# Patient Record
Sex: FEMALE | Race: ASIAN | NOT HISPANIC OR LATINO
[De-identification: names, ages, dates, MRNs, and addresses within clinical notes are randomized per-mention and may not be internally consistent; named-entity substitution may affect disease eponyms.]

---

## 2022-10-25 PROBLEM — Z00.00 ENCOUNTER FOR PREVENTIVE HEALTH EXAMINATION: Status: ACTIVE | Noted: 2022-10-25

## 2022-10-28 ENCOUNTER — NON-APPOINTMENT (OUTPATIENT)
Age: 67
End: 2022-10-28

## 2022-10-28 ENCOUNTER — APPOINTMENT (OUTPATIENT)
Dept: COLORECTAL SURGERY | Facility: CLINIC | Age: 67
End: 2022-10-28

## 2022-10-28 VITALS
TEMPERATURE: 97.2 F | DIASTOLIC BLOOD PRESSURE: 81 MMHG | HEART RATE: 92 BPM | BODY MASS INDEX: 24.8 KG/M2 | SYSTOLIC BLOOD PRESSURE: 157 MMHG | HEIGHT: 67 IN | WEIGHT: 158 LBS

## 2022-10-28 DIAGNOSIS — Z12.11 ENCOUNTER FOR SCREENING FOR MALIGNANT NEOPLASM OF COLON: ICD-10-CM

## 2022-10-28 PROCEDURE — 99205 OFFICE O/P NEW HI 60 MIN: CPT

## 2022-10-28 RX ORDER — METRONIDAZOLE 500 MG/1
500 TABLET ORAL
Qty: 6 | Refills: 0 | Status: ACTIVE | COMMUNITY
Start: 2022-10-28 | End: 1900-01-01

## 2022-10-28 RX ORDER — CIPROFLOXACIN HYDROCHLORIDE 500 MG/1
500 TABLET, FILM COATED ORAL
Qty: 2 | Refills: 0 | Status: ACTIVE | COMMUNITY
Start: 2022-10-28 | End: 1900-01-01

## 2022-10-28 RX ORDER — POLYETHYLENE GLYCOL 3350 17 G/17G
17 POWDER, FOR SOLUTION ORAL
Qty: 238 | Refills: 0 | Status: ACTIVE | COMMUNITY
Start: 2022-10-28 | End: 1900-01-01

## 2022-10-28 NOTE — HISTORY OF PRESENT ILLNESS
[FreeTextEntry1] : 68 yo Mandarin speaking F presents for evaluation of colon mass seen on CT scan, referred by GI Amari Cordon\par PMH HTN, HLD, On ASA 81 mg for general heart health\par PSH B/L breast surgery, path c/w fibroadenoma per pt\par FMH: Father w/ lung CA (tobacco use), Mother w/ colon CA diagnosed age 90s\par \par \par Pt denies prior colonoscopy. Negative FOBT 2020, 2021 and most recently was positive 10/24/22\par PCP referred to Dr. Ian Cordon for colonoscopy, however not performed and referred to this office for further evaluation\par \par Per chart review, PCP collected labs 10/6/22 at [facility]: Hgb/Hct 14/41, LFTs, Alk P  WNL\par Due to h/o known renal stones and past appendicitis (noted on 4/2022 CT scan, s/p hospitalization w/ abx, however surgery never completed) PCP Dr. Jennyfer Billingsley referred for repeat CT a/p which was completed at [facility] on 10/24/22 for c/o chronic appendicitis:\par Impression:\par Focal hyperenhancement at medial cecum involving the origin of the appendix suggestive of soft tissue mass. The appendix is dilated showing wall enhancement and mild wall thickening w/ subtle periappendiceal fat stranding. The findings suggest cecal mass (possible malignancy) causing obstruction of the appendix. If not recently performed, a colonoscopy is recommended\par Hepatic cysts\par Non obstructing left renal calculi. B/L renal cysts\par Prior outside imaging not available for comparison\par \par Pt reports she feels well. Appetite and energy good\par Denies fever, unintentional weight loss, abd pain, n/v/c/d, change in bowel habits, BRBPR\par \par BH: once daily, loose which is typical for her\par Denies intake of dietary fiber and water\par Denies use of stool softeners or fiber supplements\par \par Prior imaging:\par CT urogram/CT a/p completed at [facility] 4/29/22 for evaluation of renal stone/abd pain:\par Appendix thickened and dilated w/ jerzy-appendiceal fat stranding. There is a 1.5x1.9x1.6 cm collection in the tip of the appendix, possible abscess formation and/or dilated lumen. No evidence of extraluminal air. No bowel obstruction.\par Impression:\par Findings c/w acute appendicitis. Collection may r/t abscess formation and or dilated lumen\par PCP was referred to ER, possibly [facility], treated w/ antibiotics and recommended surgery in 2-3 months\par Per pt, was recommended to have surgery, however due to family emergency, surgery was postponed and never occurred\par \par

## 2022-10-28 NOTE — ASSESSMENT
[FreeTextEntry1] : I have discussed with the patient with a Chinese  and her findings of CAT scan are consistent with a right colon cancer.  We will proceed with colonoscopy for further evaluation and assessment including biopsy.  In addition we will perform colonoscopy to exclude possible synchronous secondary colorectal polyps and cancer.\par \par .\par \par I had extensive discussion with the patient (60 minutes) regarding the diagnosis and treatment options. I recommended that he consider proceeding with a robotic right hemicolectomy if the biopsy is consistent with a right colon cancer.\par The associated risks, benefits, alternatives of the procedure have been outlined discussed and reviewed with the patient's family. These risks including but not limited to bleeding, infection, anastomotic leak, need for secondary surgery, need for ileostomy or colostomy creation, change in bowel habits,  as well as the risk of heart and lung complications infection and death were detailed. The patient understands these risks and consents the planned procedure. Appropriate  literature regarding surgery and post operative treatment/complications and enhanced recovery pathway has been detailed and reviewed. Consent was obtained. All questions were answered.\par \par A chinese  was utilized for the entire visit . All questions were addressed.\par

## 2022-11-02 ENCOUNTER — APPOINTMENT (OUTPATIENT)
Dept: COLORECTAL SURGERY | Facility: CLINIC | Age: 67
End: 2022-11-02

## 2022-11-02 PROCEDURE — 45385 COLONOSCOPY W/LESION REMOVAL: CPT

## 2022-11-02 PROCEDURE — 45380 COLONOSCOPY AND BIOPSY: CPT | Mod: 59

## 2022-11-05 ENCOUNTER — RESULT REVIEW (OUTPATIENT)
Age: 67
End: 2022-11-05

## 2022-11-05 ENCOUNTER — OUTPATIENT (OUTPATIENT)
Dept: OUTPATIENT SERVICES | Facility: HOSPITAL | Age: 67
LOS: 1 days | End: 2022-11-05

## 2022-11-05 ENCOUNTER — APPOINTMENT (OUTPATIENT)
Dept: CT IMAGING | Facility: CLINIC | Age: 67
End: 2022-11-05

## 2022-11-05 PROCEDURE — 71250 CT THORAX DX C-: CPT | Mod: 26

## 2022-11-07 ENCOUNTER — TRANSCRIPTION ENCOUNTER (OUTPATIENT)
Age: 67
End: 2022-11-07

## 2022-11-07 VITALS
SYSTOLIC BLOOD PRESSURE: 157 MMHG | DIASTOLIC BLOOD PRESSURE: 72 MMHG | WEIGHT: 155.65 LBS | TEMPERATURE: 97 F | RESPIRATION RATE: 16 BRPM | HEIGHT: 67 IN | OXYGEN SATURATION: 97 % | HEART RATE: 87 BPM

## 2022-11-08 ENCOUNTER — TRANSCRIPTION ENCOUNTER (OUTPATIENT)
Age: 67
End: 2022-11-08

## 2022-11-08 ENCOUNTER — APPOINTMENT (OUTPATIENT)
Dept: COLORECTAL SURGERY | Facility: HOSPITAL | Age: 67
End: 2022-11-08

## 2022-11-08 ENCOUNTER — RESULT REVIEW (OUTPATIENT)
Age: 67
End: 2022-11-08

## 2022-11-08 ENCOUNTER — INPATIENT (INPATIENT)
Facility: HOSPITAL | Age: 67
LOS: 3 days | Discharge: HOME | DRG: 331 | End: 2022-11-12
Attending: SURGERY | Admitting: SURGERY
Payer: MEDICARE

## 2022-11-08 DIAGNOSIS — Z98.890 OTHER SPECIFIED POSTPROCEDURAL STATES: Chronic | ICD-10-CM

## 2022-11-08 LAB
BLD GP AB SCN SERPL QL: NEGATIVE — SIGNIFICANT CHANGE UP
RH IG SCN BLD-IMP: POSITIVE — SIGNIFICANT CHANGE UP

## 2022-11-08 PROCEDURE — 88309 TISSUE EXAM BY PATHOLOGIST: CPT | Mod: 26

## 2022-11-08 PROCEDURE — 44205 LAP COLECTOMY PART W/ILEUM: CPT | Mod: GC

## 2022-11-08 DEVICE — CLIP APPLIER ETHICON LIGAMAX 5MM: Type: IMPLANTABLE DEVICE | Site: RIGHT | Status: FUNCTIONAL

## 2022-11-08 DEVICE — XI STAPLER SUREFORM RELOAD 60 WHITE: Type: IMPLANTABLE DEVICE | Site: RIGHT | Status: FUNCTIONAL

## 2022-11-08 DEVICE — XI STAPLER SUREFORM RELOAD 60 BLUE: Type: IMPLANTABLE DEVICE | Site: RIGHT | Status: FUNCTIONAL

## 2022-11-08 RX ORDER — SODIUM CHLORIDE 9 MG/ML
1000 INJECTION, SOLUTION INTRAVENOUS
Refills: 0 | Status: DISCONTINUED | OUTPATIENT
Start: 2022-11-08 | End: 2022-11-09

## 2022-11-08 RX ORDER — ATORVASTATIN CALCIUM 80 MG/1
1 TABLET, FILM COATED ORAL
Qty: 0 | Refills: 0 | DISCHARGE

## 2022-11-08 RX ORDER — KETOROLAC TROMETHAMINE 30 MG/ML
15 SYRINGE (ML) INJECTION EVERY 6 HOURS
Refills: 0 | Status: DISCONTINUED | OUTPATIENT
Start: 2022-11-08 | End: 2022-11-11

## 2022-11-08 RX ORDER — HEPARIN SODIUM 5000 [USP'U]/ML
5000 INJECTION INTRAVENOUS; SUBCUTANEOUS EVERY 8 HOURS
Refills: 0 | Status: DISCONTINUED | OUTPATIENT
Start: 2022-11-08 | End: 2022-11-12

## 2022-11-08 RX ORDER — SCOPALAMINE 1 MG/3D
1 PATCH, EXTENDED RELEASE TRANSDERMAL ONCE
Refills: 0 | Status: COMPLETED | OUTPATIENT
Start: 2022-11-08 | End: 2022-11-08

## 2022-11-08 RX ORDER — AMLODIPINE BESYLATE 2.5 MG/1
1 TABLET ORAL
Qty: 0 | Refills: 0 | DISCHARGE

## 2022-11-08 RX ORDER — ACETAMINOPHEN 500 MG
1000 TABLET ORAL ONCE
Refills: 0 | Status: COMPLETED | OUTPATIENT
Start: 2022-11-08 | End: 2022-11-08

## 2022-11-08 RX ORDER — HYDROMORPHONE HYDROCHLORIDE 2 MG/ML
0.5 INJECTION INTRAMUSCULAR; INTRAVENOUS; SUBCUTANEOUS EVERY 4 HOURS
Refills: 0 | Status: DISCONTINUED | OUTPATIENT
Start: 2022-11-08 | End: 2022-11-10

## 2022-11-08 RX ORDER — ATORVASTATIN CALCIUM 80 MG/1
10 TABLET, FILM COATED ORAL AT BEDTIME
Refills: 0 | Status: DISCONTINUED | OUTPATIENT
Start: 2022-11-08 | End: 2022-11-12

## 2022-11-08 RX ORDER — HEPARIN SODIUM 5000 [USP'U]/ML
5000 INJECTION INTRAVENOUS; SUBCUTANEOUS ONCE
Refills: 0 | Status: COMPLETED | OUTPATIENT
Start: 2022-11-08 | End: 2022-11-08

## 2022-11-08 RX ORDER — AMLODIPINE BESYLATE 2.5 MG/1
5 TABLET ORAL DAILY
Refills: 0 | Status: DISCONTINUED | OUTPATIENT
Start: 2022-11-09 | End: 2022-11-09

## 2022-11-08 RX ORDER — ACETAMINOPHEN 500 MG
1000 TABLET ORAL EVERY 6 HOURS
Refills: 0 | Status: DISCONTINUED | OUTPATIENT
Start: 2022-11-08 | End: 2022-11-12

## 2022-11-08 RX ORDER — ONDANSETRON 8 MG/1
4 TABLET, FILM COATED ORAL EVERY 8 HOURS
Refills: 0 | Status: DISCONTINUED | OUTPATIENT
Start: 2022-11-08 | End: 2022-11-12

## 2022-11-08 RX ORDER — METOCLOPRAMIDE HCL 10 MG
10 TABLET ORAL ONCE
Refills: 0 | Status: COMPLETED | OUTPATIENT
Start: 2022-11-08 | End: 2022-11-08

## 2022-11-08 RX ADMIN — ONDANSETRON 4 MILLIGRAM(S): 8 TABLET, FILM COATED ORAL at 19:03

## 2022-11-08 RX ADMIN — HEPARIN SODIUM 5000 UNIT(S): 5000 INJECTION INTRAVENOUS; SUBCUTANEOUS at 12:32

## 2022-11-08 RX ADMIN — Medication 10 MILLIGRAM(S): at 19:57

## 2022-11-08 RX ADMIN — Medication 15 MILLIGRAM(S): at 22:04

## 2022-11-08 RX ADMIN — Medication 400 MILLIGRAM(S): at 19:16

## 2022-11-08 RX ADMIN — Medication 1000 MILLIGRAM(S): at 12:31

## 2022-11-08 RX ADMIN — SCOPALAMINE 1 PATCH: 1 PATCH, EXTENDED RELEASE TRANSDERMAL at 19:58

## 2022-11-08 RX ADMIN — SODIUM CHLORIDE 40 MILLILITER(S): 9 INJECTION, SOLUTION INTRAVENOUS at 22:05

## 2022-11-08 RX ADMIN — HEPARIN SODIUM 5000 UNIT(S): 5000 INJECTION INTRAVENOUS; SUBCUTANEOUS at 22:05

## 2022-11-08 RX ADMIN — Medication 1000 MILLIGRAM(S): at 19:31

## 2022-11-08 RX ADMIN — Medication 15 MILLIGRAM(S): at 23:00

## 2022-11-08 NOTE — PRE-ANESTHESIA EVALUATION ADULT - NSANTHOSAYNRD_GEN_A_CORE
No. DARIANA screening performed.  STOP BANG Legend: 0-2 = LOW Risk; 3-4 = INTERMEDIATE Risk; 5-8 = HIGH Risk

## 2022-11-08 NOTE — H&P ADULT - NSICDXPASTMEDICALHX_GEN_ALL_CORE_FT
PAST MEDICAL HISTORY:  Arthritis knee    Elevated cholesterol     HTN (hypertension)     Kidney stones     Other specified diseases of intestine

## 2022-11-08 NOTE — H&P ADULT - HISTORY OF PRESENT ILLNESS
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 This year's FOBT positive for blood along with CT A/P in 10/22 notable for mass of the cecum. Cscope on 11/2/22 notable for polyps in the rectym, transverse colon and cecum- cecal lesion notable for TVA with HG dysplasia. CT chest 11/5 negative for metastasis.

## 2022-11-08 NOTE — PROGRESS NOTE ADULT - SUBJECTIVE AND OBJECTIVE BOX
POST-OPERATIVE NOTE    Procedure: Robot-assisted laparoscopic right hemicolectomy    Diagnosis/Indication: Colonic mass    Surgeon: Dr. Jackson    S: Pt complaints of dizziness and slight nausea, however is resting comfortably in bed. Denies CP, SOB, calf tenderness, and vomiting. Pain controlled with medication. ( number [id number])    O:  T(C): 36.6 (11-08-22 @ 21:22), Max: 36.6 (11-08-22 @ 21:22)  T(F): 97.8 (11-08-22 @ 21:22), Max: 97.8 (11-08-22 @ 21:22)  HR: 71 (11-08-22 @ 21:22) (62 - 104)  BP: 117/69 (11-08-22 @ 21:22) (114/59 - 159/74)  RR: 16 (11-08-22 @ 21:22) (10 - 20)  SpO2: 100% (11-08-22 @ 21:22) (98% - 100%)  Wt(kg): --    Gen: NAD, resting comfortably in bed  C/V: NSR  Pulm: Nonlabored breathing, no respiratory distress  Abd: soft, NT/ND, no rebound or guarding, incision areas are covered with derma bond and are C/D/I with no erythema  : Sales with appropriate urine output with light yellow urine  Extrem: WWP, no calf edema or tenderness, SCDs in place  Neuro: AAOx3 when spoken to    A/P: 67y Female s/p above procedure  Diet: CLD  IVF: LR @ 40cc/hr  Pain/nausea control  Sales  SQH/SCDs/OOBA/IS  Dispo pending pain control, PO tolerance, clinical improvement

## 2022-11-08 NOTE — BRIEF OPERATIVE NOTE - OPERATION/FINDINGS
Veress needle access. Optiview placement of 12mm port; rest of ports placed under direct visualization. TAP block. Right colon and hepatic flexure mobilized. High ligation of ileocolic and R branch of middle colic with Vessel sealer. Proximal and distal margins taken with robotic stapler blue load. Ileocolic anastomosis created with robotic stapler white load x 1 fire. Common channel closed with V-lock running suture and Lemberted. Hemostatic. 8cm off midline incision created in RLQ and specimen removed. Closing tray utilized; gowns and gloves changed. Peritoneum closed with 2-0 Vicryl running; fascia closed with #1 PDS running. Closed skin with 3-0 Vicryl deep dermal and 4-0 Monocryl.

## 2022-11-08 NOTE — H&P ADULT - NSHPPHYSICALEXAM_GEN_ALL_CORE
Constitutional: no acute distress  Heart: s1 s2  Lungs: no use of accessory muscles  Abdomen: soft, nontender, nondistended, without masses, erythema, ecchymosis  Extremities: no edema

## 2022-11-08 NOTE — PRE-ANESTHESIA EVALUATION ADULT - NSPROPOSEDPROCEDFT_GEN_ALL_CORE
Patient: Kellie Cox    Procedure Summary     Date: 07/22/21 Room / Location:  [facility]  /  [facility]    Anesthesia Start: 0755 Anesthesia Stop: 0848    Procedure: LAPAROSCOPIC CHOLECYSTECTOMY WITH CHOLANGIOGRAM (N/A Abdomen) Diagnosis: (BILIARY DYSKINESIA)    Surgeons: Nell Alvarado MD Provider: Mario Carrillo CRNA    Anesthesia Type: general ASA Status: 3          Anesthesia Type: general    Vitals  Vitals Value Taken Time   /64 07/22/21 0928   Temp 98.5 °F (36.9 °C) 07/22/21 0933   Pulse 96 07/22/21 0933   Resp 15 07/22/21 0933   SpO2 96 % 07/22/21 0933   Vitals shown include unvalidated device data.        Post Anesthesia Care and Evaluation    Patient location during evaluation: PACU  Patient participation: complete - patient participated  Level of consciousness: awake and alert  Pain management: adequate  Airway patency: patent  Anesthetic complications: No anesthetic complications  PONV Status: none  Cardiovascular status: acceptable and hemodynamically stable  Respiratory status: acceptable  Hydration status: acceptable    Comments: Blood pressure 105/64, pulse 107, temperature 98.5 °F (36.9 °C), temperature source Temporal, resp. rate 15, SpO2 96 %, not currently breastfeeding.    Patient discharged from PACU based upon Lori score. Please see RN notes for further details      
Colon Ca

## 2022-11-08 NOTE — H&P ADULT - ASSESSMENT
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      Plan:  to OR  admission post op  ERAS protocol  Colon bundle

## 2022-11-09 ENCOUNTER — RESULT REVIEW (OUTPATIENT)
Age: 67
End: 2022-11-09

## 2022-11-09 LAB
ANION GAP SERPL CALC-SCNC: 10 MMOL/L — SIGNIFICANT CHANGE UP (ref 5–17)
BUN SERPL-MCNC: 9 MG/DL — SIGNIFICANT CHANGE UP (ref 7–23)
CALCIUM SERPL-MCNC: 8.5 MG/DL — SIGNIFICANT CHANGE UP (ref 8.4–10.5)
CHLORIDE SERPL-SCNC: 98 MMOL/L — SIGNIFICANT CHANGE UP (ref 96–108)
CO2 SERPL-SCNC: 27 MMOL/L — SIGNIFICANT CHANGE UP (ref 22–31)
CREAT SERPL-MCNC: 0.68 MG/DL — SIGNIFICANT CHANGE UP (ref 0.5–1.3)
EGFR: 94 ML/MIN/1.73M2 — SIGNIFICANT CHANGE UP
GLUCOSE SERPL-MCNC: 122 MG/DL — HIGH (ref 70–99)
HCT VFR BLD CALC: 36.6 % — SIGNIFICANT CHANGE UP (ref 34.5–45)
HCV AB S/CO SERPL IA: 0.05 S/CO — SIGNIFICANT CHANGE UP
HCV AB SERPL-IMP: SIGNIFICANT CHANGE UP
HGB BLD-MCNC: 12.1 G/DL — SIGNIFICANT CHANGE UP (ref 11.5–15.5)
MAGNESIUM SERPL-MCNC: 1.8 MG/DL — SIGNIFICANT CHANGE UP (ref 1.6–2.6)
MCHC RBC-ENTMCNC: 30.3 PG — SIGNIFICANT CHANGE UP (ref 27–34)
MCHC RBC-ENTMCNC: 33.1 GM/DL — SIGNIFICANT CHANGE UP (ref 32–36)
MCV RBC AUTO: 91.5 FL — SIGNIFICANT CHANGE UP (ref 80–100)
NRBC # BLD: 0 /100 WBCS — SIGNIFICANT CHANGE UP (ref 0–0)
PHOSPHATE SERPL-MCNC: 3.4 MG/DL — SIGNIFICANT CHANGE UP (ref 2.5–4.5)
PLATELET # BLD AUTO: 218 K/UL — SIGNIFICANT CHANGE UP (ref 150–400)
POTASSIUM SERPL-MCNC: 3.7 MMOL/L — SIGNIFICANT CHANGE UP (ref 3.5–5.3)
POTASSIUM SERPL-SCNC: 3.7 MMOL/L — SIGNIFICANT CHANGE UP (ref 3.5–5.3)
RBC # BLD: 4 M/UL — SIGNIFICANT CHANGE UP (ref 3.8–5.2)
RBC # FLD: 12.2 % — SIGNIFICANT CHANGE UP (ref 10.3–14.5)
SODIUM SERPL-SCNC: 135 MMOL/L — SIGNIFICANT CHANGE UP (ref 135–145)
WBC # BLD: 7.97 K/UL — SIGNIFICANT CHANGE UP (ref 3.8–10.5)
WBC # FLD AUTO: 7.97 K/UL — SIGNIFICANT CHANGE UP (ref 3.8–10.5)

## 2022-11-09 PROCEDURE — 88321 CONSLTJ&REPRT SLD PREP ELSWR: CPT

## 2022-11-09 RX ORDER — BENZOCAINE AND MENTHOL 5; 1 G/100ML; G/100ML
1 LIQUID ORAL
Refills: 0 | Status: DISCONTINUED | OUTPATIENT
Start: 2022-11-09 | End: 2022-11-12

## 2022-11-09 RX ORDER — BENZOCAINE AND MENTHOL 5; 1 G/100ML; G/100ML
1 LIQUID ORAL
Refills: 0 | Status: DISCONTINUED | OUTPATIENT
Start: 2022-11-09 | End: 2022-11-09

## 2022-11-09 RX ORDER — POTASSIUM CHLORIDE 20 MEQ
20 PACKET (EA) ORAL ONCE
Refills: 0 | Status: COMPLETED | OUTPATIENT
Start: 2022-11-09 | End: 2022-11-09

## 2022-11-09 RX ORDER — MAGNESIUM SULFATE 500 MG/ML
1 VIAL (ML) INJECTION ONCE
Refills: 0 | Status: COMPLETED | OUTPATIENT
Start: 2022-11-09 | End: 2022-11-09

## 2022-11-09 RX ORDER — AMLODIPINE BESYLATE 2.5 MG/1
5 TABLET ORAL DAILY
Refills: 0 | Status: DISCONTINUED | OUTPATIENT
Start: 2022-11-10 | End: 2022-11-12

## 2022-11-09 RX ADMIN — Medication 1000 MILLIGRAM(S): at 06:00

## 2022-11-09 RX ADMIN — Medication 1000 MILLIGRAM(S): at 06:25

## 2022-11-09 RX ADMIN — HEPARIN SODIUM 5000 UNIT(S): 5000 INJECTION INTRAVENOUS; SUBCUTANEOUS at 06:00

## 2022-11-09 RX ADMIN — Medication 15 MILLIGRAM(S): at 10:30

## 2022-11-09 RX ADMIN — Medication 100 GRAM(S): at 07:54

## 2022-11-09 RX ADMIN — Medication 15 MILLIGRAM(S): at 10:22

## 2022-11-09 RX ADMIN — Medication 1000 MILLIGRAM(S): at 13:12

## 2022-11-09 RX ADMIN — AMLODIPINE BESYLATE 5 MILLIGRAM(S): 2.5 TABLET ORAL at 10:22

## 2022-11-09 RX ADMIN — Medication 15 MILLIGRAM(S): at 17:44

## 2022-11-09 RX ADMIN — Medication 1000 MILLIGRAM(S): at 01:30

## 2022-11-09 RX ADMIN — Medication 15 MILLIGRAM(S): at 04:06

## 2022-11-09 RX ADMIN — Medication 15 MILLIGRAM(S): at 23:01

## 2022-11-09 RX ADMIN — BENZOCAINE AND MENTHOL 1 LOZENGE: 5; 1 LIQUID ORAL at 20:25

## 2022-11-09 RX ADMIN — Medication 1000 MILLIGRAM(S): at 17:44

## 2022-11-09 RX ADMIN — SCOPALAMINE 1 PATCH: 1 PATCH, EXTENDED RELEASE TRANSDERMAL at 06:29

## 2022-11-09 RX ADMIN — SCOPALAMINE 1 PATCH: 1 PATCH, EXTENDED RELEASE TRANSDERMAL at 17:44

## 2022-11-09 RX ADMIN — Medication 20 MILLIEQUIVALENT(S): at 07:54

## 2022-11-09 RX ADMIN — Medication 1000 MILLIGRAM(S): at 23:00

## 2022-11-09 RX ADMIN — Medication 1000 MILLIGRAM(S): at 00:30

## 2022-11-09 RX ADMIN — ATORVASTATIN CALCIUM 10 MILLIGRAM(S): 80 TABLET, FILM COATED ORAL at 21:12

## 2022-11-09 RX ADMIN — HEPARIN SODIUM 5000 UNIT(S): 5000 INJECTION INTRAVENOUS; SUBCUTANEOUS at 13:59

## 2022-11-09 RX ADMIN — Medication 15 MILLIGRAM(S): at 17:41

## 2022-11-09 RX ADMIN — HEPARIN SODIUM 5000 UNIT(S): 5000 INJECTION INTRAVENOUS; SUBCUTANEOUS at 21:13

## 2022-11-09 RX ADMIN — Medication 1000 MILLIGRAM(S): at 12:36

## 2022-11-09 RX ADMIN — Medication 1000 MILLIGRAM(S): at 17:41

## 2022-11-09 RX ADMIN — Medication 15 MILLIGRAM(S): at 05:20

## 2022-11-09 NOTE — PROGRESS NOTE ADULT - SUBJECTIVE AND OBJECTIVE BOX
INTERVAL HPI/OVERNIGHT EVENTS: pain controlled, c/o dizziness on POC and slight nausea, good urine o/p, vss     STATUS POST: 11/8: robotic assisted right hemicolectomy    POST OPERATIVE DAY #: 1    SUBJECTIVE: Pt seen and examined at bedside this am by surgery team. Reporting feeling dizzy when getting up, VSS. Tolerating diet, pain well controlled. -F/-BM. Denies f/n/v/cp/sob.    MEDICATIONS  (STANDING):  acetaminophen     Tablet .. 1000 milliGRAM(s) Oral every 6 hours  amLODIPine   Tablet 5 milliGRAM(s) Oral daily  atorvastatin 10 milliGRAM(s) Oral at bedtime  heparin   Injectable 5000 Unit(s) SubCutaneous every 8 hours  ketorolac   Injectable 15 milliGRAM(s) IV Push every 6 hours  lactated ringers. 1000 milliLiter(s) (40 mL/Hr) IV Continuous <Continuous>    MEDICATIONS  (PRN):  HYDROmorphone  Injectable 0.5 milliGRAM(s) IV Push every 4 hours PRN Severe Pain (7 - 10)  ondansetron Injectable 4 milliGRAM(s) IV Push every 8 hours PRN Nausea and/or Vomiting    Vital Signs Last 24 Hrs  T(C): 36.8 (09 Nov 2022 04:26), Max: 36.8 (09 Nov 2022 04:26)  T(F): 98.2 (09 Nov 2022 04:26), Max: 98.2 (09 Nov 2022 04:26)  HR: 74 (09 Nov 2022 04:26) (62 - 104)  BP: 110/68 (09 Nov 2022 04:26) (110/68 - 159/74)  BP(mean): 81 (08 Nov 2022 20:33) (81 - 106)  RR: 17 (09 Nov 2022 04:26) (10 - 20)  SpO2: 95% (09 Nov 2022 04:26) (95% - 100%)    Parameters below as of 09 Nov 2022 04:26  Patient On (Oxygen Delivery Method): room air    PHYSICAL EXAM:    Constitutional: A&Ox3, NAD    Respiratory: non labored breathing, no respiratory distress    Cardiovascular: NSR, RRR    Gastrointestinal: abdomen soft, nd, appropriately ttp to incisions. Dressings c/d/i    Genitourinary: Sales in place draining clear yellow urine     Extremities: wwp, no calf tenderness or edema. SCDs in place     I&O's Detail    08 Nov 2022 07:01  -  09 Nov 2022 07:00  --------------------------------------------------------  IN:    IV PiggyBack: 100 mL    Lactated Ringers: 600 mL  Total IN: 700 mL    OUT:    Indwelling Catheter - Urethral (mL): 1550 mL  Total OUT: 1550 mL    Total NET: -850 mL          LABS:                        12.1   7.97  )-----------( 218      ( 09 Nov 2022 05:43 )             36.6     11-09    135  |  98  |  9   ----------------------------<  122<H>  3.7   |  27  |  0.68    Ca    8.5      09 Nov 2022 05:43  Phos  3.4     11-09  Mg     1.8     11-09            RADIOLOGY & ADDITIONAL STUDIES:

## 2022-11-10 ENCOUNTER — OUTPATIENT (OUTPATIENT)
Dept: OUTPATIENT SERVICES | Facility: HOSPITAL | Age: 67
LOS: 1 days | End: 2022-11-10
Payer: MEDICARE

## 2022-11-10 ENCOUNTER — TRANSCRIPTION ENCOUNTER (OUTPATIENT)
Age: 67
End: 2022-11-10

## 2022-11-10 DIAGNOSIS — K63.89 OTHER SPECIFIED DISEASES OF INTESTINE: ICD-10-CM

## 2022-11-10 DIAGNOSIS — Z98.890 OTHER SPECIFIED POSTPROCEDURAL STATES: Chronic | ICD-10-CM

## 2022-11-10 PROBLEM — E78.00 PURE HYPERCHOLESTEROLEMIA, UNSPECIFIED: Chronic | Status: ACTIVE | Noted: 2022-11-07

## 2022-11-10 PROBLEM — I10 ESSENTIAL (PRIMARY) HYPERTENSION: Chronic | Status: ACTIVE | Noted: 2022-11-07

## 2022-11-10 LAB
ANION GAP SERPL CALC-SCNC: 9 MMOL/L — SIGNIFICANT CHANGE UP (ref 5–17)
BUN SERPL-MCNC: 11 MG/DL — SIGNIFICANT CHANGE UP (ref 7–23)
CALCIUM SERPL-MCNC: 8.6 MG/DL — SIGNIFICANT CHANGE UP (ref 8.4–10.5)
CHLORIDE SERPL-SCNC: 104 MMOL/L — SIGNIFICANT CHANGE UP (ref 96–108)
CO2 SERPL-SCNC: 26 MMOL/L — SIGNIFICANT CHANGE UP (ref 22–31)
CREAT SERPL-MCNC: 0.75 MG/DL — SIGNIFICANT CHANGE UP (ref 0.5–1.3)
EGFR: 86 ML/MIN/1.73M2 — SIGNIFICANT CHANGE UP
GLUCOSE SERPL-MCNC: 106 MG/DL — HIGH (ref 70–99)
HCT VFR BLD CALC: 36.1 % — SIGNIFICANT CHANGE UP (ref 34.5–45)
HGB BLD-MCNC: 11.9 G/DL — SIGNIFICANT CHANGE UP (ref 11.5–15.5)
MAGNESIUM SERPL-MCNC: 2.2 MG/DL — SIGNIFICANT CHANGE UP (ref 1.6–2.6)
MCHC RBC-ENTMCNC: 30.2 PG — SIGNIFICANT CHANGE UP (ref 27–34)
MCHC RBC-ENTMCNC: 33 GM/DL — SIGNIFICANT CHANGE UP (ref 32–36)
MCV RBC AUTO: 91.6 FL — SIGNIFICANT CHANGE UP (ref 80–100)
NRBC # BLD: 0 /100 WBCS — SIGNIFICANT CHANGE UP (ref 0–0)
PHOSPHATE SERPL-MCNC: 2.6 MG/DL — SIGNIFICANT CHANGE UP (ref 2.5–4.5)
PLATELET # BLD AUTO: 204 K/UL — SIGNIFICANT CHANGE UP (ref 150–400)
POTASSIUM SERPL-MCNC: 3.8 MMOL/L — SIGNIFICANT CHANGE UP (ref 3.5–5.3)
POTASSIUM SERPL-SCNC: 3.8 MMOL/L — SIGNIFICANT CHANGE UP (ref 3.5–5.3)
RBC # BLD: 3.94 M/UL — SIGNIFICANT CHANGE UP (ref 3.8–5.2)
RBC # FLD: 12.6 % — SIGNIFICANT CHANGE UP (ref 10.3–14.5)
SODIUM SERPL-SCNC: 139 MMOL/L — SIGNIFICANT CHANGE UP (ref 135–145)
WBC # BLD: 7.66 K/UL — SIGNIFICANT CHANGE UP (ref 3.8–10.5)
WBC # FLD AUTO: 7.66 K/UL — SIGNIFICANT CHANGE UP (ref 3.8–10.5)

## 2022-11-10 PROCEDURE — 88321 CONSLTJ&REPRT SLD PREP ELSWR: CPT

## 2022-11-10 RX ORDER — POTASSIUM PHOSPHATE, MONOBASIC POTASSIUM PHOSPHATE, DIBASIC 236; 224 MG/ML; MG/ML
15 INJECTION, SOLUTION INTRAVENOUS ONCE
Refills: 0 | Status: COMPLETED | OUTPATIENT
Start: 2022-11-10 | End: 2022-11-10

## 2022-11-10 RX ORDER — OXYCODONE HYDROCHLORIDE 5 MG/1
10 TABLET ORAL EVERY 6 HOURS
Refills: 0 | Status: DISCONTINUED | OUTPATIENT
Start: 2022-11-10 | End: 2022-11-12

## 2022-11-10 RX ORDER — OXYCODONE HYDROCHLORIDE 5 MG/1
5 TABLET ORAL EVERY 6 HOURS
Refills: 0 | Status: DISCONTINUED | OUTPATIENT
Start: 2022-11-10 | End: 2022-11-12

## 2022-11-10 RX ADMIN — Medication 1000 MILLIGRAM(S): at 05:12

## 2022-11-10 RX ADMIN — ATORVASTATIN CALCIUM 10 MILLIGRAM(S): 80 TABLET, FILM COATED ORAL at 21:05

## 2022-11-10 RX ADMIN — POTASSIUM PHOSPHATE, MONOBASIC POTASSIUM PHOSPHATE, DIBASIC 62.5 MILLIMOLE(S): 236; 224 INJECTION, SOLUTION INTRAVENOUS at 11:47

## 2022-11-10 RX ADMIN — HEPARIN SODIUM 5000 UNIT(S): 5000 INJECTION INTRAVENOUS; SUBCUTANEOUS at 05:12

## 2022-11-10 RX ADMIN — SCOPALAMINE 1 PATCH: 1 PATCH, EXTENDED RELEASE TRANSDERMAL at 17:11

## 2022-11-10 RX ADMIN — Medication 15 MILLIGRAM(S): at 00:10

## 2022-11-10 RX ADMIN — Medication 1000 MILLIGRAM(S): at 23:01

## 2022-11-10 RX ADMIN — Medication 15 MILLIGRAM(S): at 05:10

## 2022-11-10 RX ADMIN — Medication 15 MILLIGRAM(S): at 17:46

## 2022-11-10 RX ADMIN — Medication 1000 MILLIGRAM(S): at 23:49

## 2022-11-10 RX ADMIN — Medication 15 MILLIGRAM(S): at 11:47

## 2022-11-10 RX ADMIN — Medication 1000 MILLIGRAM(S): at 18:00

## 2022-11-10 RX ADMIN — Medication 1000 MILLIGRAM(S): at 06:00

## 2022-11-10 RX ADMIN — Medication 1000 MILLIGRAM(S): at 12:00

## 2022-11-10 RX ADMIN — Medication 15 MILLIGRAM(S): at 23:49

## 2022-11-10 RX ADMIN — HEPARIN SODIUM 5000 UNIT(S): 5000 INJECTION INTRAVENOUS; SUBCUTANEOUS at 13:11

## 2022-11-10 RX ADMIN — HEPARIN SODIUM 5000 UNIT(S): 5000 INJECTION INTRAVENOUS; SUBCUTANEOUS at 21:05

## 2022-11-10 RX ADMIN — SCOPALAMINE 1 PATCH: 1 PATCH, EXTENDED RELEASE TRANSDERMAL at 05:18

## 2022-11-10 RX ADMIN — Medication 15 MILLIGRAM(S): at 12:00

## 2022-11-10 RX ADMIN — Medication 1000 MILLIGRAM(S): at 00:10

## 2022-11-10 RX ADMIN — Medication 15 MILLIGRAM(S): at 18:00

## 2022-11-10 RX ADMIN — Medication 15 MILLIGRAM(S): at 23:01

## 2022-11-10 RX ADMIN — AMLODIPINE BESYLATE 5 MILLIGRAM(S): 2.5 TABLET ORAL at 05:10

## 2022-11-10 RX ADMIN — Medication 15 MILLIGRAM(S): at 06:00

## 2022-11-10 RX ADMIN — Medication 1000 MILLIGRAM(S): at 17:46

## 2022-11-10 RX ADMIN — Medication 1000 MILLIGRAM(S): at 11:47

## 2022-11-10 NOTE — DISCHARGE NOTE PROVIDER - NSDCCPCAREPLAN_GEN_ALL_CORE_FT
PRINCIPAL DISCHARGE DIAGNOSIS  Diagnosis: S/P right hemicolectomy  Assessment and Plan of Treatment: 67F, mandarin speaking, with pmh of HTN, HLD, GERD, anxiety and psh of b/l breast lumpectomy (fibdroadenoma) now s/p RA right hemicolectomy for cecal adenoma 11/8.  LFD  Pain/nausea control prn  SQH/SCDs  OOBA/IS

## 2022-11-10 NOTE — DISCHARGE NOTE PROVIDER - CARE PROVIDER_API CALL
Tushar Jackson)  ColonRectal Surgery; Surgery  [facility], 2nd Floor  [place], [place] [address]  Phone: [phone]  Fax: [phone]  Follow Up Time:

## 2022-11-10 NOTE — DISCHARGE NOTE PROVIDER - HOSPITAL COURSE
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 FOBT positive for blood along with CT A/P in 10/22 notable for mass of the cecum. Cscope on 11/2/22 notable for polyps in the rectum, transverse colon and cecum- cecal lesion notable for TVA with HG dysplasia. CT chest 11/5 negative for metastasis. Pt was admitted for elective RA right hemicolectomy. She is now s/p right hemicolectomy on 11/8. Patient's postoperative course was uncomplicated. Diet was advanced as tolerated and pain was well controlled on medication. On the day of discharge, the patient was deemed stable and ready to return home with a plan to follow up as an outpatient.      INCOMPLETE!!    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 FOBT positive for blood along with CT A/P in 10/22 notable for mass of the cecum. Cscope on 11/2/22 notable for polyps in the rectum, transverse colon and cecum- cecal lesion notable for TVA with HG dysplasia. CT chest 11/5 negative for metastasis. Pt was admitted for elective RA right hemicolectomy. She is now s/p right hemicolectomy on 11/8. Patient's postoperative course was uncomplicated. Diet was advanced as tolerated and pain was well controlled on medication. On the day of discharge, the patient was deemed stable and ready to return home with a plan to follow up as an outpatient.

## 2022-11-10 NOTE — DISCHARGE NOTE PROVIDER - NSDCCPTREATMENT_GEN_ALL_CORE_FT
PRINCIPAL PROCEDURE  Procedure: Robot-assisted laparoscopic right hemicolectomy  Findings and Treatment:

## 2022-11-10 NOTE — DISCHARGE NOTE PROVIDER - NSDCFUADDINST_GEN_ALL_CORE_FT
General Discharge Instructions:  Please resume all regular home medications unless specifically advised not to take a particular medication. Also, please take any new medications as prescribed.  Please get plenty of rest, continue to ambulate several times per day, and drink adequate amounts of fluids. Avoid lifting weights greater than 5-10 lbs until you follow-up with your surgeon, who will instruct you further regarding activity restrictions.  Avoid driving or operating heavy machinery while taking pain medications.  Please follow-up with your surgeon and Primary Care Provider (PCP) as advised.  Incision Care:  *Please call your doctor or nurse practitioner if you have increased pain, swelling, redness, or drainage from the incision site.  *Avoid swimming and baths until your follow-up appointment.  *You may shower, and wash surgical incisions with a mild soap and warm water. Gently pat the area dry.  *If you have staples, they will be removed at your follow-up appointment.  *If you have steri-strips, they will fall off on their own. Please remove any remaining strips 7-10 days after surgery.   General Discharge Instructions:  Please resume all regular home medications unless specifically advised not to take a particular medication. Also, please take any new medications as prescribed.    Do not restart aspirin until follow up with Dr. Jackson.    Please get plenty of rest, continue to ambulate several times per day, and drink adequate amounts of fluids. Avoid lifting weights greater than 5-10 lbs until you follow-up with your surgeon, who will instruct you further regarding activity restrictions.  Avoid driving or operating heavy machinery while taking pain medications.  Please follow-up with your surgeon and Primary Care Provider (PCP) as advised.  Incision Care:  *Please call your doctor or nurse practitioner if you have increased pain, swelling, redness, or drainage from the incision site.  *Avoid swimming and baths until your follow-up appointment.  *You may shower, and wash surgical incisions with a mild soap and warm water. Gently pat the area dry.  *If you have staples, they will be removed at your follow-up appointment.  *If you have steri-strips, they will fall off on their own. Please remove any remaining strips 7-10 days after surgery.   General Discharge Instructions:  Please resume all regular home medications unless specifically advised not to take a particular medication. Also, please take any new medications as prescribed.    You may take over-the-counter pain medications such as tylenol (acetaminophen) and motrin (ibuprofen NSAID - anti-inflammatory).    Do not restart aspirin until follow up with Dr. Jackson.    Please get plenty of rest, continue to ambulate several times per day, and drink adequate amounts of fluids. Avoid lifting weights greater than 5-10 lbs until you follow-up with your surgeon, who will instruct you further regarding activity restrictions.  Avoid driving or operating heavy machinery while taking pain medications.  Please follow-up with your surgeon and Primary Care Provider (PCP) as advised.  Incision Care:  *Please call your doctor or nurse practitioner if you have increased pain, swelling, redness, or drainage from the incision site.  *Avoid swimming and baths until your follow-up appointment.  *You may shower, and wash surgical incisions with a mild soap and warm water. Gently pat the area dry.  *If you have staples, they will be removed at your follow-up appointment.  *If you have steri-strips, they will fall off on their own. Please remove any remaining strips 7-10 days after surgery.

## 2022-11-10 NOTE — PROGRESS NOTE ADULT - SUBJECTIVE AND OBJECTIVE BOX
INTERVAL HPI/OVERNIGHT EVENTS: jennifer cld, -n/-v, +F/-BM    STATUS POST: 11/8: robotic assisted right hemicolectomy    POST OPERATIVE DAY #: 2    SUBJECTIVE: Pt seen and examined at bedside this am by surgery team. No acute complaints. Tolerating diet, pain well controlled. -F/-BM. Denies f/n/v/cp/sob.    MEDICATIONS  (STANDING):  acetaminophen     Tablet .. 1000 milliGRAM(s) Oral every 6 hours  amLODIPine   Tablet 5 milliGRAM(s) Oral daily  atorvastatin 10 milliGRAM(s) Oral at bedtime  heparin   Injectable 5000 Unit(s) SubCutaneous every 8 hours  ketorolac   Injectable 15 milliGRAM(s) IV Push every 6 hours    MEDICATIONS  (PRN):  benzocaine 15 mG/menthol 3.6 mG Lozenge 1 Lozenge Oral two times a day PRN Sore Throat  ondansetron Injectable 4 milliGRAM(s) IV Push every 8 hours PRN Nausea and/or Vomiting  oxyCODONE    IR 5 milliGRAM(s) Oral every 6 hours PRN Moderate Pain (4 - 6)  oxyCODONE    IR 10 milliGRAM(s) Oral every 6 hours PRN Severe Pain (7 - 10)    Vital Signs Last 24 Hrs  T(C): 36.7 (10 Nov 2022 05:07), Max: 37.4 (09 Nov 2022 20:14)  T(F): 98 (10 Nov 2022 05:07), Max: 99.3 (09 Nov 2022 20:14)  HR: 62 (10 Nov 2022 05:07) (62 - 67)  BP: 119/73 (10 Nov 2022 05:07) (101/59 - 119/73)  BP(mean): 88 (10 Nov 2022 05:07) (79 - 88)  RR: 17 (10 Nov 2022 05:07) (17 - 18)  SpO2: 95% (10 Nov 2022 05:07) (94% - 98%)    Parameters below as of 10 Nov 2022 05:07  Patient On (Oxygen Delivery Method): room air    PHYSICAL EXAM:    Constitutional: A&Ox3, NAD    Respiratory: non labored breathing, no respiratory distress    Cardiovascular: NSR, RRR    Gastrointestinal: abdomen soft, nd, appropriately ttp to incisions. Dressings c/d/i    Extremities: wwp, no calf tenderness or edema. SCDs in place     I&O's Detail    09 Nov 2022 07:01  -  10 Nov 2022 07:00  --------------------------------------------------------  IN:    Lactated Ringers: 360 mL    Oral Fluid: 1460 mL  Total IN: 1820 mL    OUT:    Indwelling Catheter - Urethral (mL): 925 mL    Voided (mL): 1700 mL  Total OUT: 2625 mL    Total NET: -805 mL          LABS:                        11.9   7.66  )-----------( 204      ( 10 Nov 2022 06:48 )             36.1     11-10    139  |  104  |  11  ----------------------------<  106<H>  3.8   |  26  |  0.75    Ca    8.6      10 Nov 2022 06:48  Phos  2.6     11-10  Mg     2.2     11-10            RADIOLOGY & ADDITIONAL STUDIES:

## 2022-11-10 NOTE — DISCHARGE NOTE PROVIDER - NSDCMRMEDTOKEN_GEN_ALL_CORE_FT
amLODIPine 5 mg oral tablet: 1 tab(s) orally once a day  aspirin 81 mg oral tablet: orally once a day  atorvastatin 10 mg oral tablet: 1 tab(s) orally once a day   amLODIPine 5 mg oral tablet: 1 tab(s) orally once a day  atorvastatin 10 mg oral tablet: 1 tab(s) orally once a day  Colace 100 mg oral capsule: 1 cap(s) orally every 8 hours While taking oxycodone  oxyCODONE 5 mg oral tablet: 1 tab(s) orally every 6 hours MDD:4   amLODIPine 5 mg oral tablet: 1 tab(s) orally once a day  atorvastatin 10 mg oral tablet: 1 tab(s) orally once a day  Colace 100 mg oral capsule: 1 cap(s) orally every 8 hours While taking oxycodone to prevent constipation  oxyCODONE 5 mg oral tablet: 1 tab(s) orally every 6 hours, As Needed -for severe pain MDD:4

## 2022-11-11 LAB
ANION GAP SERPL CALC-SCNC: 10 MMOL/L — SIGNIFICANT CHANGE UP (ref 5–17)
BUN SERPL-MCNC: 19 MG/DL — SIGNIFICANT CHANGE UP (ref 7–23)
CALCIUM SERPL-MCNC: 8.8 MG/DL — SIGNIFICANT CHANGE UP (ref 8.4–10.5)
CHLORIDE SERPL-SCNC: 104 MMOL/L — SIGNIFICANT CHANGE UP (ref 96–108)
CO2 SERPL-SCNC: 26 MMOL/L — SIGNIFICANT CHANGE UP (ref 22–31)
CREAT SERPL-MCNC: 0.77 MG/DL — SIGNIFICANT CHANGE UP (ref 0.5–1.3)
EGFR: 83 ML/MIN/1.73M2 — SIGNIFICANT CHANGE UP
GLUCOSE SERPL-MCNC: 122 MG/DL — HIGH (ref 70–99)
HCT VFR BLD CALC: 32 % — LOW (ref 34.5–45)
HCT VFR BLD CALC: 33.6 % — LOW (ref 34.5–45)
HGB BLD-MCNC: 10.4 G/DL — LOW (ref 11.5–15.5)
HGB BLD-MCNC: 11.1 G/DL — LOW (ref 11.5–15.5)
MAGNESIUM SERPL-MCNC: 2.1 MG/DL — SIGNIFICANT CHANGE UP (ref 1.6–2.6)
MCHC RBC-ENTMCNC: 30 PG — SIGNIFICANT CHANGE UP (ref 27–34)
MCHC RBC-ENTMCNC: 30.5 PG — SIGNIFICANT CHANGE UP (ref 27–34)
MCHC RBC-ENTMCNC: 32.5 GM/DL — SIGNIFICANT CHANGE UP (ref 32–36)
MCHC RBC-ENTMCNC: 33 GM/DL — SIGNIFICANT CHANGE UP (ref 32–36)
MCV RBC AUTO: 92.2 FL — SIGNIFICANT CHANGE UP (ref 80–100)
MCV RBC AUTO: 92.3 FL — SIGNIFICANT CHANGE UP (ref 80–100)
NRBC # BLD: 0 /100 WBCS — SIGNIFICANT CHANGE UP (ref 0–0)
NRBC # BLD: 0 /100 WBCS — SIGNIFICANT CHANGE UP (ref 0–0)
PHOSPHATE SERPL-MCNC: 3.5 MG/DL — SIGNIFICANT CHANGE UP (ref 2.5–4.5)
PLATELET # BLD AUTO: 194 K/UL — SIGNIFICANT CHANGE UP (ref 150–400)
PLATELET # BLD AUTO: 225 K/UL — SIGNIFICANT CHANGE UP (ref 150–400)
POTASSIUM SERPL-MCNC: 4 MMOL/L — SIGNIFICANT CHANGE UP (ref 3.5–5.3)
POTASSIUM SERPL-SCNC: 4 MMOL/L — SIGNIFICANT CHANGE UP (ref 3.5–5.3)
RBC # BLD: 3.47 M/UL — LOW (ref 3.8–5.2)
RBC # BLD: 3.64 M/UL — LOW (ref 3.8–5.2)
RBC # FLD: 12.7 % — SIGNIFICANT CHANGE UP (ref 10.3–14.5)
RBC # FLD: 12.7 % — SIGNIFICANT CHANGE UP (ref 10.3–14.5)
SODIUM SERPL-SCNC: 140 MMOL/L — SIGNIFICANT CHANGE UP (ref 135–145)
SURGICAL PATHOLOGY STUDY: SIGNIFICANT CHANGE UP
WBC # BLD: 5.65 K/UL — SIGNIFICANT CHANGE UP (ref 3.8–10.5)
WBC # BLD: 6.12 K/UL — SIGNIFICANT CHANGE UP (ref 3.8–10.5)
WBC # FLD AUTO: 5.65 K/UL — SIGNIFICANT CHANGE UP (ref 3.8–10.5)
WBC # FLD AUTO: 6.12 K/UL — SIGNIFICANT CHANGE UP (ref 3.8–10.5)

## 2022-11-11 RX ADMIN — Medication 1000 MILLIGRAM(S): at 18:10

## 2022-11-11 RX ADMIN — Medication 15 MILLIGRAM(S): at 11:18

## 2022-11-11 RX ADMIN — HEPARIN SODIUM 5000 UNIT(S): 5000 INJECTION INTRAVENOUS; SUBCUTANEOUS at 14:47

## 2022-11-11 RX ADMIN — Medication 15 MILLIGRAM(S): at 05:03

## 2022-11-11 RX ADMIN — ATORVASTATIN CALCIUM 10 MILLIGRAM(S): 80 TABLET, FILM COATED ORAL at 21:20

## 2022-11-11 RX ADMIN — Medication 1000 MILLIGRAM(S): at 12:36

## 2022-11-11 RX ADMIN — SCOPALAMINE 1 PATCH: 1 PATCH, EXTENDED RELEASE TRANSDERMAL at 19:19

## 2022-11-11 RX ADMIN — SCOPALAMINE 1 PATCH: 1 PATCH, EXTENDED RELEASE TRANSDERMAL at 06:05

## 2022-11-11 RX ADMIN — HEPARIN SODIUM 5000 UNIT(S): 5000 INJECTION INTRAVENOUS; SUBCUTANEOUS at 21:21

## 2022-11-11 RX ADMIN — Medication 15 MILLIGRAM(S): at 06:05

## 2022-11-11 RX ADMIN — Medication 1000 MILLIGRAM(S): at 06:05

## 2022-11-11 RX ADMIN — Medication 1000 MILLIGRAM(S): at 05:02

## 2022-11-11 RX ADMIN — HEPARIN SODIUM 5000 UNIT(S): 5000 INJECTION INTRAVENOUS; SUBCUTANEOUS at 05:03

## 2022-11-11 RX ADMIN — Medication 1000 MILLIGRAM(S): at 19:10

## 2022-11-11 RX ADMIN — AMLODIPINE BESYLATE 5 MILLIGRAM(S): 2.5 TABLET ORAL at 05:02

## 2022-11-11 RX ADMIN — Medication 15 MILLIGRAM(S): at 11:34

## 2022-11-11 NOTE — PROGRESS NOTE ADULT - SUBJECTIVE AND OBJECTIVE BOX
INTERVAL HPI/OVERNIGHT EVENTS:    STATUS POST:  11/8: robotic assisted right hemicolectomy      POST OPERATIVE DAY #:     SUBJECTIVE: Pt seen and examined at bedside this am by surgery team. Tolerating diet, pain well controlled. Denies f/n/v/cp/sob.    MEDICATIONS  (STANDING):  acetaminophen     Tablet .. 1000 milliGRAM(s) Oral every 6 hours  amLODIPine   Tablet 5 milliGRAM(s) Oral daily  atorvastatin 10 milliGRAM(s) Oral at bedtime  heparin   Injectable 5000 Unit(s) SubCutaneous every 8 hours  ketorolac   Injectable 15 milliGRAM(s) IV Push every 6 hours    MEDICATIONS  (PRN):  benzocaine 15 mG/menthol 3.6 mG Lozenge 1 Lozenge Oral two times a day PRN Sore Throat  ondansetron Injectable 4 milliGRAM(s) IV Push every 8 hours PRN Nausea and/or Vomiting  oxyCODONE    IR 5 milliGRAM(s) Oral every 6 hours PRN Moderate Pain (4 - 6)  oxyCODONE    IR 10 milliGRAM(s) Oral every 6 hours PRN Severe Pain (7 - 10)      Vital Signs Last 24 Hrs  T(C): 36.8 (11 Nov 2022 04:39), Max: 37.7 (10 Nov 2022 20:19)  T(F): 98.2 (11 Nov 2022 04:39), Max: 99.8 (10 Nov 2022 20:19)  HR: 68 (11 Nov 2022 04:39) (63 - 70)  BP: 113/68 (11 Nov 2022 04:39) (100/63 - 117/70)  BP(mean): 83 (10 Nov 2022 09:00) (83 - 83)  RR: 17 (11 Nov 2022 04:39) (17 - 19)  SpO2: 96% (11 Nov 2022 04:39) (95% - 98%)    Parameters below as of 11 Nov 2022 04:39  Patient On (Oxygen Delivery Method): room air        PHYSICAL EXAM:      Constitutional: A&Ox3    Breasts:    Respiratory: non labored breathing, no respiratory distress    Cardiovascular: NSR, RRR    Gastrointestinal:                 Incision:    Genitourinary:    Extremities: (-) edema                  I&O's Detail    10 Nov 2022 07:01  -  11 Nov 2022 07:00  --------------------------------------------------------  IN:    IV PiggyBack: 250 mL    Oral Fluid: 1700 mL  Total IN: 1950 mL    OUT:    Voided (mL): 850 mL  Total OUT: 850 mL    Total NET: 1100 mL          LABS:                        11.1   6.12  )-----------( 225      ( 11 Nov 2022 05:58 )             33.6     11-11    140  |  104  |  19  ----------------------------<  122<H>  4.0   |  26  |  0.77    Ca    8.8      11 Nov 2022 05:58  Phos  3.5     11-11  Mg     2.1     11-11            RADIOLOGY & ADDITIONAL STUDIES: INTERVAL HPI/OVERNIGHT EVENTS:  loose, dark bloody BMsx4, similar to previous, +F, minimal PO intake 2/2 abd pain, unsaved voids 2/2 loose stools    STATUS POST:  11/8: robotic assisted right hemicolectomy    POST OPERATIVE DAY #: 3    SUBJECTIVE: Pt seen and examined at bedside this am by surgery team. Denies abdominal pain, passing flatus and having multiple BMs. States concerns for dark stool BMs. Otherwise, tolerating diet, pain well controlled. Denies f/n/v/cp/sob.    MEDICATIONS  (STANDING):  acetaminophen     Tablet .. 1000 milliGRAM(s) Oral every 6 hours  amLODIPine   Tablet 5 milliGRAM(s) Oral daily  atorvastatin 10 milliGRAM(s) Oral at bedtime  heparin   Injectable 5000 Unit(s) SubCutaneous every 8 hours  ketorolac   Injectable 15 milliGRAM(s) IV Push every 6 hours    MEDICATIONS  (PRN):  benzocaine 15 mG/menthol 3.6 mG Lozenge 1 Lozenge Oral two times a day PRN Sore Throat  ondansetron Injectable 4 milliGRAM(s) IV Push every 8 hours PRN Nausea and/or Vomiting  oxyCODONE    IR 5 milliGRAM(s) Oral every 6 hours PRN Moderate Pain (4 - 6)  oxyCODONE    IR 10 milliGRAM(s) Oral every 6 hours PRN Severe Pain (7 - 10)      Vital Signs Last 24 Hrs  T(C): 36.8 (11 Nov 2022 04:39), Max: 37.7 (10 Nov 2022 20:19)  T(F): 98.2 (11 Nov 2022 04:39), Max: 99.8 (10 Nov 2022 20:19)  HR: 68 (11 Nov 2022 04:39) (63 - 70)  BP: 113/68 (11 Nov 2022 04:39) (100/63 - 117/70)  BP(mean): 83 (10 Nov 2022 09:00) (83 - 83)  RR: 17 (11 Nov 2022 04:39) (17 - 19)  SpO2: 96% (11 Nov 2022 04:39) (95% - 98%)    Parameters below as of 11 Nov 2022 04:39  Patient On (Oxygen Delivery Method): room air        PHYSICAL EXAM:      Constitutional: A&Ox3    Respiratory: non labored breathing, no respiratory distress    Cardiovascular: NSR, RRR    Gastrointestinal: abdomen soft, nd, nt   Incision: c/d/i     Genitourinary: voiding     Extremities: WWP, no calf tenderness or edema, SCDs in place                   I&O's Detail    10 Nov 2022 07:01  -  11 Nov 2022 07:00  --------------------------------------------------------  IN:    IV PiggyBack: 250 mL    Oral Fluid: 1700 mL  Total IN: 1950 mL    OUT:    Voided (mL): 850 mL  Total OUT: 850 mL    Total NET: 1100 mL          LABS:                        11.1   6.12  )-----------( 225      ( 11 Nov 2022 05:58 )             33.6     11-11    140  |  104  |  19  ----------------------------<  122<H>  4.0   |  26  |  0.77    Ca    8.8      11 Nov 2022 05:58  Phos  3.5     11-11  Mg     2.1     11-11            RADIOLOGY & ADDITIONAL STUDIES:

## 2022-11-12 ENCOUNTER — TRANSCRIPTION ENCOUNTER (OUTPATIENT)
Age: 67
End: 2022-11-12

## 2022-11-12 VITALS
SYSTOLIC BLOOD PRESSURE: 114 MMHG | RESPIRATION RATE: 16 BRPM | DIASTOLIC BLOOD PRESSURE: 66 MMHG | OXYGEN SATURATION: 98 % | TEMPERATURE: 99 F | HEART RATE: 70 BPM

## 2022-11-12 LAB
ANION GAP SERPL CALC-SCNC: 8 MMOL/L — SIGNIFICANT CHANGE UP (ref 5–17)
BUN SERPL-MCNC: 13 MG/DL — SIGNIFICANT CHANGE UP (ref 7–23)
CALCIUM SERPL-MCNC: 9 MG/DL — SIGNIFICANT CHANGE UP (ref 8.4–10.5)
CHLORIDE SERPL-SCNC: 105 MMOL/L — SIGNIFICANT CHANGE UP (ref 96–108)
CO2 SERPL-SCNC: 27 MMOL/L — SIGNIFICANT CHANGE UP (ref 22–31)
CREAT SERPL-MCNC: 0.68 MG/DL — SIGNIFICANT CHANGE UP (ref 0.5–1.3)
EGFR: 94 ML/MIN/1.73M2 — SIGNIFICANT CHANGE UP
GLUCOSE SERPL-MCNC: 114 MG/DL — HIGH (ref 70–99)
HCT VFR BLD CALC: 32.7 % — LOW (ref 34.5–45)
HGB BLD-MCNC: 10.6 G/DL — LOW (ref 11.5–15.5)
MAGNESIUM SERPL-MCNC: 2.2 MG/DL — SIGNIFICANT CHANGE UP (ref 1.6–2.6)
MCHC RBC-ENTMCNC: 30 PG — SIGNIFICANT CHANGE UP (ref 27–34)
MCHC RBC-ENTMCNC: 32.4 GM/DL — SIGNIFICANT CHANGE UP (ref 32–36)
MCV RBC AUTO: 92.6 FL — SIGNIFICANT CHANGE UP (ref 80–100)
NRBC # BLD: 0 /100 WBCS — SIGNIFICANT CHANGE UP (ref 0–0)
PHOSPHATE SERPL-MCNC: 3.3 MG/DL — SIGNIFICANT CHANGE UP (ref 2.5–4.5)
PLATELET # BLD AUTO: 234 K/UL — SIGNIFICANT CHANGE UP (ref 150–400)
POTASSIUM SERPL-MCNC: 4.1 MMOL/L — SIGNIFICANT CHANGE UP (ref 3.5–5.3)
POTASSIUM SERPL-SCNC: 4.1 MMOL/L — SIGNIFICANT CHANGE UP (ref 3.5–5.3)
RBC # BLD: 3.53 M/UL — LOW (ref 3.8–5.2)
RBC # FLD: 13 % — SIGNIFICANT CHANGE UP (ref 10.3–14.5)
SODIUM SERPL-SCNC: 140 MMOL/L — SIGNIFICANT CHANGE UP (ref 135–145)
WBC # BLD: 5.99 K/UL — SIGNIFICANT CHANGE UP (ref 3.8–10.5)
WBC # FLD AUTO: 5.99 K/UL — SIGNIFICANT CHANGE UP (ref 3.8–10.5)

## 2022-11-12 PROCEDURE — 84100 ASSAY OF PHOSPHORUS: CPT

## 2022-11-12 PROCEDURE — 86850 RBC ANTIBODY SCREEN: CPT

## 2022-11-12 PROCEDURE — 36415 COLL VENOUS BLD VENIPUNCTURE: CPT

## 2022-11-12 PROCEDURE — 80048 BASIC METABOLIC PNL TOTAL CA: CPT

## 2022-11-12 PROCEDURE — 82962 GLUCOSE BLOOD TEST: CPT

## 2022-11-12 PROCEDURE — 86803 HEPATITIS C AB TEST: CPT

## 2022-11-12 PROCEDURE — 88309 TISSUE EXAM BY PATHOLOGIST: CPT

## 2022-11-12 PROCEDURE — C1889: CPT

## 2022-11-12 PROCEDURE — 86901 BLOOD TYPING SEROLOGIC RH(D): CPT

## 2022-11-12 PROCEDURE — 85027 COMPLETE CBC AUTOMATED: CPT

## 2022-11-12 PROCEDURE — 83735 ASSAY OF MAGNESIUM: CPT

## 2022-11-12 PROCEDURE — 86900 BLOOD TYPING SEROLOGIC ABO: CPT

## 2022-11-12 PROCEDURE — S2900: CPT

## 2022-11-12 RX ORDER — ASPIRIN/CALCIUM CARB/MAGNESIUM 324 MG
0 TABLET ORAL
Qty: 0 | Refills: 0 | DISCHARGE

## 2022-11-12 RX ORDER — DOCUSATE SODIUM 100 MG
1 CAPSULE ORAL
Qty: 9 | Refills: 0
Start: 2022-11-12 | End: 2022-11-14

## 2022-11-12 RX ORDER — OXYCODONE HYDROCHLORIDE 5 MG/1
1 TABLET ORAL
Qty: 12 | Refills: 0
Start: 2022-11-12 | End: 2022-11-14

## 2022-11-12 RX ADMIN — Medication 1000 MILLIGRAM(S): at 11:14

## 2022-11-12 RX ADMIN — Medication 1000 MILLIGRAM(S): at 05:28

## 2022-11-12 RX ADMIN — Medication 1000 MILLIGRAM(S): at 06:28

## 2022-11-12 RX ADMIN — HEPARIN SODIUM 5000 UNIT(S): 5000 INJECTION INTRAVENOUS; SUBCUTANEOUS at 14:05

## 2022-11-12 RX ADMIN — HEPARIN SODIUM 5000 UNIT(S): 5000 INJECTION INTRAVENOUS; SUBCUTANEOUS at 05:28

## 2022-11-12 RX ADMIN — Medication 1000 MILLIGRAM(S): at 11:53

## 2022-11-12 NOTE — DISCHARGE NOTE NURSING/CASE MANAGEMENT/SOCIAL WORK - PATIENT PORTAL LINK FT
You can access the FollowMyHealth Patient Portal offered by [facility] by registering at the following website: [email or web address]. By joining [facility]’s FollowMyHealth portal, you will also be able to view your health information using other applications (apps) compatible with our system.

## 2022-11-12 NOTE — DISCHARGE NOTE NURSING/CASE MANAGEMENT/SOCIAL WORK - NSDCPEFALRISK_GEN_ALL_CORE
For information on Fall & Injury Prevention, visit: [email or web address] OR  [email or web address] OR  https://www.cdc.gov/steadi/patient.html

## 2022-11-12 NOTE — PROGRESS NOTE ADULT - ASSESSMENT
67F Mandarin speaking, PMH HTN, HLD, GERD, anxiety and psh of b/l breast lumpectomy (fibdroadenoma) now s/p right hemicolectomy for cecal adenoma 11/8.    CLD/IVF  pain/nausea control  SQH/SCDs  mars  OOBA/IS    
67F, mandarin speaking, with pmh of HTN, HLD, GERD, anxiety and psh of b/l breast lumpectomy (fibdroadenoma) now s/p RA right hemicolectomy for cecal adenoma 11/8.    LFD  Pain/nausea control prn  SQH/SCDs  OOBA/IS  D/c home today if tolerating diet/HD stable   
67F, mandarin speaking, with pmh of HTN, HLD, GERD, anxiety and psh of b/l breast lumpectomy (fibdroadenoma) now s/p RA right hemicolectomy for cecal adenoma 11/8.    LFD  Pain/nausea control prn  SQH/SCDs  OOBA/IS  
67F, mandarin speaking, with pmh of HTN, HLD, GERD, anxiety and psh of b/l breast lumpectomy (fibdroadenoma) now s/p RA right hemicolectomy for cecal adenoma 11/8.    f/u CBC @ 12pm for dark bloody BMs   LFD  Pain/nausea control prn  SQH/SCDs  OOBA/IS

## 2022-11-12 NOTE — PROGRESS NOTE ADULT - SUBJECTIVE AND OBJECTIVE BOX
STATUS POST:  Robot-assisted laparoscopic right hemicolectomy    Robot-assisted laparoscopic right hemicolectomy    Robot-assisted laparoscopic right hemicolectomy        POST OPERATIVE DAY #:     SUBJECTIVE:   Patient seen and examined on am rounds. No new complaints. -n-v-cp-sob.    Vital Signs Last 24 Hrs  T(C): 36.9 (12 Nov 2022 05:16), Max: 37.1 (11 Nov 2022 20:38)  T(F): 98.4 (12 Nov 2022 05:16), Max: 98.8 (11 Nov 2022 20:38)  HR: 61 (12 Nov 2022 05:16) (58 - 64)  BP: 108/68 (12 Nov 2022 05:16) (101/62 - 115/70)  BP(mean): --  RR: 17 (12 Nov 2022 05:16) (17 - 18)  SpO2: 96% (12 Nov 2022 05:16) (96% - 99%)    Parameters below as of 12 Nov 2022 05:16  Patient On (Oxygen Delivery Method): room air        I&O's Summary    11 Nov 2022 07:01  -  12 Nov 2022 07:00  --------------------------------------------------------  IN: 720 mL / OUT: 800 mL / NET: -80 mL        Physical Exam:  General Appearance: Appears well, NAD  Pulmonary: Nonlabored breathing, no respiratory distress  Cardiovascular: NSR  Abdomen: Soft, nondisteded  Extremities: WWP, SCD's in place     LABS:                        10.4   5.65  )-----------( 194      ( 11 Nov 2022 12:24 )             32.0     11-11    140  |  104  |  19  ----------------------------<  122<H>  4.0   |  26  |  0.77    Ca    8.8      11 Nov 2022 05:58  Phos  3.5     11-11  Mg     2.1     11-11           STATUS POST:  Robot-assisted laparoscopic right hemicolectomy    Robot-assisted laparoscopic right hemicolectomy    Robot-assisted laparoscopic right hemicolectomy      SUBJECTIVE:   Patient seen and examined on am rounds. No new complaints. Patient is tolerating diet, +F+BM, pain 3-4/10 and only intermittent, feels ready to go home. -n-v-cp-sob.    Vital Signs Last 24 Hrs  T(C): 36.9 (12 Nov 2022 05:16), Max: 37.1 (11 Nov 2022 20:38)  T(F): 98.4 (12 Nov 2022 05:16), Max: 98.8 (11 Nov 2022 20:38)  HR: 61 (12 Nov 2022 05:16) (58 - 64)  BP: 108/68 (12 Nov 2022 05:16) (101/62 - 115/70)  BP(mean): --  RR: 17 (12 Nov 2022 05:16) (17 - 18)  SpO2: 96% (12 Nov 2022 05:16) (96% - 99%)    Parameters below as of 12 Nov 2022 05:16  Patient On (Oxygen Delivery Method): room air        I&O's Summary    11 Nov 2022 07:01  -  12 Nov 2022 07:00  --------------------------------------------------------  IN: 720 mL / OUT: 800 mL / NET: -80 mL        Physical Exam:  General Appearance: Appears well, NAD  Pulmonary: Nonlabored breathing, no respiratory distress  Cardiovascular: NSR  Abdomen: Soft, nondisteded, nontender.  Extremities: WWP, SCD's in place     LABS:                        10.4   5.65  )-----------( 194      ( 11 Nov 2022 12:24 )             32.0     11-11    140  |  104  |  19  ----------------------------<  122<H>  4.0   |  26  |  0.77    Ca    8.8      11 Nov 2022 05:58  Phos  3.5     11-11  Mg     2.1     11-11

## 2022-11-12 NOTE — DISCHARGE NOTE NURSING/CASE MANAGEMENT/SOCIAL WORK - NSDCPNINST_GEN_ALL_CORE
follow instructions as written by MD. Do not scrub incision sites. Pat dry completely dry when out of the shower

## 2022-11-14 LAB — SURGICAL PATHOLOGY STUDY: SIGNIFICANT CHANGE UP

## 2022-11-17 DIAGNOSIS — I10 ESSENTIAL (PRIMARY) HYPERTENSION: ICD-10-CM

## 2022-11-17 DIAGNOSIS — Z87.442 PERSONAL HISTORY OF URINARY CALCULI: ICD-10-CM

## 2022-11-17 DIAGNOSIS — K63.5 POLYP OF COLON: ICD-10-CM

## 2022-11-17 DIAGNOSIS — Z79.82 LONG TERM (CURRENT) USE OF ASPIRIN: ICD-10-CM

## 2022-11-17 DIAGNOSIS — D12.7 BENIGN NEOPLASM OF RECTOSIGMOID JUNCTION: ICD-10-CM

## 2022-11-17 DIAGNOSIS — D12.3 BENIGN NEOPLASM OF TRANSVERSE COLON: ICD-10-CM

## 2022-11-17 DIAGNOSIS — E78.5 HYPERLIPIDEMIA, UNSPECIFIED: ICD-10-CM

## 2022-11-17 DIAGNOSIS — D12.0 BENIGN NEOPLASM OF CECUM: ICD-10-CM

## 2022-11-17 DIAGNOSIS — K21.9 GASTRO-ESOPHAGEAL REFLUX DISEASE WITHOUT ESOPHAGITIS: ICD-10-CM

## 2022-11-28 ENCOUNTER — APPOINTMENT (OUTPATIENT)
Dept: COLORECTAL SURGERY | Facility: CLINIC | Age: 67
End: 2022-11-28

## 2022-11-28 VITALS
TEMPERATURE: 97.2 F | DIASTOLIC BLOOD PRESSURE: 77 MMHG | WEIGHT: 152 LBS | HEIGHT: 67 IN | BODY MASS INDEX: 23.86 KG/M2 | SYSTOLIC BLOOD PRESSURE: 160 MMHG | HEART RATE: 70 BPM

## 2022-11-28 DIAGNOSIS — K63.89 OTHER SPECIFIED DISEASES OF INTESTINE: ICD-10-CM

## 2022-11-28 DIAGNOSIS — I10 ESSENTIAL (PRIMARY) HYPERTENSION: ICD-10-CM

## 2022-11-28 PROCEDURE — 99024 POSTOP FOLLOW-UP VISIT: CPT

## 2022-11-28 NOTE — PHYSICAL EXAM
[Abdomen Masses] : No abdominal masses [Abdomen Tenderness] : ~T No ~M abdominal tenderness [No HSM] : no hepatosplenomegaly [JVD] : no jugular venous distention  [Normal Breath Sounds] : Normal breath sounds [Normal Heart Sounds] : normal heart sounds [No Rash or Lesion] : No rash or lesion [Alert] : alert [de-identified] : Abdomen is soft, nontender, nondistended. Incisions are well healed. No hernia or masses\par

## 2022-11-28 NOTE — ASSESSMENT
[FreeTextEntry1] : Pathology reviewed–villous adenoma with high-grade dysplasia.  No evidence of invasive carcinoma.\par \par Patient has recovered well from surgery.\par \par Advised return to primary GI in 1 year for surveillance colonoscopy.\par \par

## 2022-11-28 NOTE — HISTORY OF PRESENT ILLNESS
[FreeTextEntry1] : 66 y/o Mandarin speaking F presents for post operative follow up evaluation of cecal mass\par H/o HTN, HLD, GERD, nephrolithiasis, appendicitis (managed nonoperatively 4/22)\par PSH B/L breast fibroadenoma s/p lumpectomy. \par \par H/o (+) FOBT 10/24/22, screening colonoscopy and biopsy performed revealed 11/2/22: TVA with high grade dysplasia of the cecum. \par \par Imaging \par CT A/P  CP advanced imaging (10/22): \par mass of the cecum\par \par CT Chest performed LHGV(11/5/22):\par negative for metastasis \par \par Patient underwent Robotic assisted right hemicolectomy 11/8/22, post operative was uneventful and patient dc'd home. \par \par Surgical Pathology Report - Auth (Verified)\par \par Specimen(s) Submitted\par 1  Right Colon\par \par Final Diagnosis\par Right colon, hemicolectomy:\par - Villous adenoma with high-grade dysplasia\par - 2.5 x 1.8 x 0.6 cm\par - Negative for invasive carcinoma\par \par - Margins negative for adenoma\par - 20 reactive lymph nodes\par - See note\par \par Note: The villous adenoma is present within the appendix and the cecum.\par It cannot be determined which structure is the site of origin. A second\par intradepartmental pathologist has reviewed this case and agrees.\par Verified by: Huber Moreau M.D.\par (Electronic Signature)\par Reported on: 11/14/22 10:26 EST, [facility], [address],\[place] [address]\par Phone: [phone]   Fax: [phone]\par _________________________________________________________________\par \par \par Clinical Information\par Right colon mass\par \par Gross Description\par Received:  Fresh labeled  "right colon"\par Integrity:  Intact\par Orientation:  Oriented by anatomic landmarks\par Proximal:  Stapled, 5.0 cm in circumference\par Distal:  Stapled, 7.0 cm in circumference\par Terminal Ileum\par Length:  7.2 cm\par Circumference:  2.5 cm\par Wall Thickness:  0.4 cm\par Mesenteric Width:  1.5 cm\par Colon\par Length:  18.5 cm\par Cecum Circumference:  10.5 cm\par Mesenteric Width:  6.5 cm\par Wall Thickness:  0.7 cm\par Pedicle:  Not Present\par Appendix: Size:  2.8 cm in length, 1.0 cm in diameter\par Appendix Appearance:  Adherent to the colon wall with a tan-pink\par roughened serosal surface. The cut surface shows a tan-pink soft mass\par within the lumen, with a small amount of translucent mucoid material\par within the tip. The mass is continuous with the mass within the cecum.\par Inking\par Serosa:  Blue\par Area of Interest -  mass\par Size:  2.5 x 1.8 x 0.6 cm, tan-pink polypoid\par Location:  Cecum\par % Circumferential Involvement:   Less than 50%\par Overlying Serosa:  Tan-pink slightly roughened\par Cut Surface:  Tan-white soft\par Extension:  Into the entire appendix, with the greatest dimension and\par measuring 3.5 cm.\par \par Distance to Proximal Margin:   8.5 cm\par Distance to Distal Margin:   16.0 cm\par Distance to Additional Margins/Landmarks\par Mesenteric:  3.5 cm\par Ileocecal Valve:  2.0 cm distal to ileocecal valve\par Remaining Mucosa:  Pink-tan and unremarkable\par Remaining Serosa:  Pink-tan, smooth and unremarkable\par Lumen:  No abnormality\par Omentum:  Not present\par Attached Mesentery:  Yellow, soft and intact\par Possible Lymph Node(s):   20 , 0.2 cm to 1.2 cm in greatest dimension\par Cut surfaces:  Unremarkable\par \par Pt reports she is doing well, appetite and energy good. She has resumed regular diet and eating small amounts throughout the day w/o adverse effect. Moving her bowels once daily, soft/formed.\par Denies fever, chills, abd pain, n/v/c/d or BRBPR\par \par \par \par  \par \par \par \par

## 2023-03-08 NOTE — PRE-OP CHECKLIST - HEIGHT IN FEET
5 Cimetidine Counseling:  I discussed with the patient the risks of Cimetidine including but not limited to gynecomastia, headache, diarrhea, nausea, drowsiness, arrhythmias, pancreatitis, skin rashes, psychosis, bone marrow suppression and kidney toxicity.

## 2024-04-05 NOTE — PRE-ANESTHESIA EVALUATION ADULT - NSANTHAPLANRD_GEN_ALL_CORE
Patient called and requested that the HC contact her sometime next week.  She stated that she would be preparing to leave for an appointment today.    Plan of Care  HC will follow up with patient next week  
general

## (undated) DEVICE — XI TIP COVER

## (undated) DEVICE — BLADE SURGICAL #15 CARBON

## (undated) DEVICE — TROCAR COVIDIEN VERSAPORT BLADELESS OPTICAL 5MM STANDARD

## (undated) DEVICE — XI ARM SCISSOR MONO CURVED

## (undated) DEVICE — SUT MONOCRYL 4-0 18" PS-2

## (undated) DEVICE — DRAPE TOWEL BLUE 17" X 24"

## (undated) DEVICE — SUT VICRYL 3-0 18" TIES

## (undated) DEVICE — XI ARM GRASPER TIP UP FENESTRATED

## (undated) DEVICE — SUT VICRYL 0 54" TIES

## (undated) DEVICE — POSITIONER STRAP KNEE & BODY 3X60" DISP

## (undated) DEVICE — SUT VICRYL 3-0 27" SH

## (undated) DEVICE — XI STAPLER SUREFORM 60

## (undated) DEVICE — GLV 6.5 PROTEXIS (WHITE)

## (undated) DEVICE — PACK GENERAL CLOSING

## (undated) DEVICE — PACK PERI GYN

## (undated) DEVICE — GLV 7.5 PROTEXIS (WHITE)

## (undated) DEVICE — DRAPE TOP SHEET 53" X 101"

## (undated) DEVICE — LIGASURE BLUNT TIP 37CM

## (undated) DEVICE — TUBING STRYKER PNEUMOSURE HI FLOW INSUFFLATOR

## (undated) DEVICE — SUT PDO 2-0 1/2 CIRCLE 26MM NDL 20CM

## (undated) DEVICE — GLV 8 PROTEXIS (WHITE)

## (undated) DEVICE — SPECIMEN CONTAINER 4OZ

## (undated) DEVICE — DRSG STERISTRIPS 0.5 X 4"

## (undated) DEVICE — XI ARM FORCEP FENESTRATED BIPOLAR 8MM

## (undated) DEVICE — SUT PDS II 1 27" CT-1

## (undated) DEVICE — SUT SILK 2-0 18" SH (POP-OFF)

## (undated) DEVICE — XI DRAPE ARM

## (undated) DEVICE — GOWN XL

## (undated) DEVICE — NDL SPINAL 22G X 3.5" (BLACK)

## (undated) DEVICE — POSITIONER PINK PAD PIGAZZI SYSTEM XL W ARM PROTECTOR

## (undated) DEVICE — TIP METZENBAUM SCISSOR MONOPOLAR ENDOCUT (ORANGE)

## (undated) DEVICE — PACK GENERAL LAPAROSCOPY

## (undated) DEVICE — SYR ASEPTO

## (undated) DEVICE — XI VESSEL SEALER

## (undated) DEVICE — WARMING BLANKET UPPER ADULT

## (undated) DEVICE — SUT VICRYL PLUS 0 36" CT-1

## (undated) DEVICE — Device

## (undated) DEVICE — INSUFFLATION NDL COVIDIEN SURGINEEDLE VERESS 120MM

## (undated) DEVICE — D HELP - CLEARVIEW CLEARIFY SYSTEM

## (undated) DEVICE — XI DRAPE COLUMN

## (undated) DEVICE — XI SEAL UNIV 5- 8 MM

## (undated) DEVICE — STOPCOCK 4-WAY

## (undated) DEVICE — MARKING PEN W RULER

## (undated) DEVICE — FOLEY TRAY 16FR 5CC LF UMETER CLOSED

## (undated) DEVICE — SYR LUER LOK 30CC

## (undated) DEVICE — DRSG DERMABOND 0.7ML

## (undated) DEVICE — XI ARM NEEDLE DRIVER LARGE

## (undated) DEVICE — DRAPE LIGHT HANDLE COVER (BLUE)

## (undated) DEVICE — XI ENDOWRIST 12 - 8 MM CANNULA REDUCER

## (undated) DEVICE — XI 12MM AND STAPLER CANNULA SEAL

## (undated) DEVICE — GLV 7 PROTEXIS (WHITE)

## (undated) DEVICE — VENODYNE/SCD SLEEVE CALF MEDIUM

## (undated) DEVICE — XI OBTURATOR OPTICAL BLADELESS 8MM